# Patient Record
Sex: FEMALE | Race: WHITE | NOT HISPANIC OR LATINO | Employment: OTHER | URBAN - METROPOLITAN AREA
[De-identification: names, ages, dates, MRNs, and addresses within clinical notes are randomized per-mention and may not be internally consistent; named-entity substitution may affect disease eponyms.]

---

## 2018-08-03 ENCOUNTER — TRANSFERRED RECORDS (OUTPATIENT)
Dept: HEALTH INFORMATION MANAGEMENT | Facility: CLINIC | Age: 78
End: 2018-08-03

## 2018-08-14 ENCOUNTER — TRANSFERRED RECORDS (OUTPATIENT)
Dept: HEALTH INFORMATION MANAGEMENT | Facility: CLINIC | Age: 78
End: 2018-08-14

## 2019-10-17 ENCOUNTER — TRANSFERRED RECORDS (OUTPATIENT)
Dept: HEALTH INFORMATION MANAGEMENT | Facility: CLINIC | Age: 79
End: 2019-10-17

## 2019-11-06 ENCOUNTER — TRANSFERRED RECORDS (OUTPATIENT)
Dept: HEALTH INFORMATION MANAGEMENT | Facility: CLINIC | Age: 79
End: 2019-11-06

## 2019-11-22 ENCOUNTER — TRANSFERRED RECORDS (OUTPATIENT)
Dept: HEALTH INFORMATION MANAGEMENT | Facility: CLINIC | Age: 79
End: 2019-11-22

## 2019-11-22 LAB — PHQ9 SCORE: 2

## 2020-01-13 ENCOUNTER — OFFICE VISIT (OUTPATIENT)
Dept: ORTHOPEDICS | Facility: CLINIC | Age: 80
End: 2020-01-13
Payer: MEDICARE

## 2020-01-13 ENCOUNTER — TELEPHONE (OUTPATIENT)
Dept: ORTHOPEDICS | Facility: CLINIC | Age: 80
End: 2020-01-13

## 2020-01-13 VITALS
SYSTOLIC BLOOD PRESSURE: 137 MMHG | WEIGHT: 171 LBS | DIASTOLIC BLOOD PRESSURE: 63 MMHG | HEART RATE: 55 BPM | HEIGHT: 60 IN | BODY MASS INDEX: 33.57 KG/M2

## 2020-01-13 DIAGNOSIS — M51.369 DDD (DEGENERATIVE DISC DISEASE), LUMBAR: ICD-10-CM

## 2020-01-13 DIAGNOSIS — M54.16 LUMBAR RADICULOPATHY: Primary | ICD-10-CM

## 2020-01-13 PROCEDURE — 99203 OFFICE O/P NEW LOW 30 MIN: CPT | Performed by: PREVENTIVE MEDICINE

## 2020-01-13 ASSESSMENT — MIFFLIN-ST. JEOR: SCORE: 1182.15

## 2020-01-13 ASSESSMENT — PAIN SCALES - GENERAL: PAINLEVEL: SEVERE PAIN (7)

## 2020-01-13 NOTE — LETTER
1/13/2020         RE: Liliane Squires  [address]  [place] [address]        Dear Colleague,    Thank you for referring your patient, Liliane Squires, to the [facility]. Please see a copy of my visit note below.    HISTORY OF PRESENT ILLNESS  Ms. Squires is a pleasant 79 year old year old female who presents to clinic today with lumbar pain  Over the past 3 years  And has pain that radiates into her legs as well  Sitting long periods cause pain  Liliane explains that she has been seen by physical therapy several times over the past few years  Has been recommended a nerve ablation in lumbar  Location: low back and legs  Quality:  achy pain    Severity: 6/10 at worst    Duration: worse over past year  Timing: occurs intermittently  Context: occurs while exercising and lifting  Modifying factors:  resting and non-use makes it better, movement and use makes it worse  Associated signs & symptoms: radiation into both legs  Additional history: as documented    MEDICAL HISTORY  There is no problem list on file for this patient.      No current outpatient medications on file.       Allergies   Allergen Reactions     Simvastatin Muscle Pain (Myalgia)     Aspirin GI Disturbance     Upset stomach. 81mg is OK.     Hydrocodone GI Disturbance     Nausea.     Sulfamethoxazole-Trimethoprim Nausea and Vomiting       No family history on file.    Additional medical/Social/Surgical histories reviewed in [employer] and updated as appropriate.     REVIEW OF SYSTEMS (1/13/2020)  10 point ROS of systems including Constitutional, Eyes, Respiratory, Cardiovascular, Gastroenterology, Genitourinary, Integumentary, Musculoskeletal, Psychiatric were all negative except for pertinent positives noted in my HPI.     PHYSICAL EXAM  Vitals:    01/13/20 1042   BP: 137/63   Pulse: 55   Weight: 77.6 kg (171 lb)   Height: 1.524 m (5')     Vital Signs: /63   Pulse 55   Ht 1.524 m (5')   Wt 77.6 kg (171 lb)    BMI 33.40 kg/m    Patient declined being weighed. Body mass index is 33.4 kg/m .    General  - normal appearance, in no obvious distress  CV  - normal peripheral perfusion  Pulm  - normal respiratory pattern, non-labored  Musculoskeletal - lumbar spine  - stance: normal gait without limp, no obvious leg length discrepancy, normal heel and toe walk  - inspection: normal bone and joint alignment, no obvious scoliosis  - palpation: no paravertebral or bony tenderness  - ROM: flexion exacerbates pain, normal extension, sidebending, rotation  - strength: lower extremities 5/5 in all planes  - special tests:  (+) straight leg raise  (+) slump test  Neuro  - patellar and Achilles DTRs 2+ bilaterally, bilateral lower extremity sensory deficit throughout L5 distribution, grossly normal coordination, normal muscle tone  Skin  - no ecchymosis, erythema, warmth, or induration, no obvious rash  Psych  - interactive, appropriate, normal mood and affect  ASSESSMENT & PLAN  80 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        Sincerely,        Maury Hurtado MD

## 2020-01-13 NOTE — PATIENT INSTRUCTIONS
Thanks for coming today.  Ortho[facility] Medicine Clinic  [address] [place] [address]    To schedule future appointments in Ortho Clinic, you may call [phone].    To schedule ordered imaging by your provider:   Call Central Imaging [phone]    To schedule an injection ordered by your provider:  Call Central Imaging Injection scheduling line: [phone]  [facility] available online at:  [email or web address]    Please call if any further questions or concerns ([phone]).  Clinic hours 8 am to 5 pm.    Return to clinic (call) if symptoms worsen or fail to improve.

## 2020-01-13 NOTE — TELEPHONE ENCOUNTER
Pt calling to say she takes Tramadol at night and pt wants to know if she should skip the Tramadol while taking other new med. Please advise with patient

## 2020-01-13 NOTE — PROGRESS NOTES
HISTORY OF PRESENT ILLNESS  Ms. Squires is a pleasant 79 year old year old female who presents to clinic today with lumbar pain  Over the past 3 years  And has pain that radiates into her legs as well  Sitting long periods cause pain  Liliane explains that she has been seen by physical therapy several times over the past few years  Has been recommended a nerve ablation in lumbar  Location: low back and legs  Quality:  achy pain    Severity: 6/10 at worst    Duration: worse over past year  Timing: occurs intermittently  Context: occurs while exercising and lifting  Modifying factors:  resting and non-use makes it better, movement and use makes it worse  Associated signs & symptoms: radiation into both legs  Additional history: as documented    MEDICAL HISTORY  There is no problem list on file for this patient.      No current outpatient medications on file.       Allergies   Allergen Reactions     Simvastatin Muscle Pain (Myalgia)     Aspirin GI Disturbance     Upset stomach. 81mg is OK.     Hydrocodone GI Disturbance     Nausea.     Sulfamethoxazole-Trimethoprim Nausea and Vomiting       No family history on file.    Additional medical/Social/Surgical histories reviewed in [facility] and updated as appropriate.     REVIEW OF SYSTEMS (1/13/2020)  10 point ROS of systems including Constitutional, Eyes, Respiratory, Cardiovascular, Gastroenterology, Genitourinary, Integumentary, Musculoskeletal, Psychiatric were all negative except for pertinent positives noted in my HPI.     PHYSICAL EXAM  Vitals:    01/13/20 1042   BP: 137/63   Pulse: 55   Weight: 77.6 kg (171 lb)   Height: 1.524 m (5')     Vital Signs: /63   Pulse 55   Ht 1.524 m (5')   Wt 77.6 kg (171 lb)   BMI 33.40 kg/m   Patient declined being weighed. Body mass index is 33.4 kg/m .    General  - normal appearance, in no obvious distress  CV  - normal peripheral perfusion  Pulm  - normal respiratory pattern, non-labored  Musculoskeletal - lumbar spine  -  stance: normal gait without limp, no obvious leg length discrepancy, normal heel and toe walk  - inspection: normal bone and joint alignment, no obvious scoliosis  - palpation: no paravertebral or bony tenderness  - ROM: flexion exacerbates pain, normal extension, sidebending, rotation  - strength: lower extremities 5/5 in all planes  - special tests:  (+) straight leg raise  (+) slump test  Neuro  - patellar and Achilles DTRs 2+ bilaterally, bilateral lower extremity sensory deficit throughout L5 distribution, grossly normal coordination, normal muscle tone  Skin  - no ecchymosis, erythema, warmth, or induration, no obvious rash  Psych  - interactive, appropriate, normal mood and affect  ASSESSMENT & PLAN  78 yo female with lumbar ddd, radicular pain  Reviewed lumbar MRI: shows ddd  Ordered GAETANO  Start pool therapy  Start tizanadine nightly PRN    Maury Hurtado MD, CAQSM

## 2020-01-14 NOTE — TELEPHONE ENCOUNTER
Per Dr. Isi RAMSEY to take both medications. Can take 1/2 of each pill.     Attempted to contact patient, call was dropped.

## 2020-01-16 RX ORDER — IBUPROFEN 600 MG/1
TABLET, FILM COATED ORAL EVERY 8 HOURS PRN
COMMUNITY
Start: 2019-07-08

## 2020-01-16 RX ORDER — AMPICILLIN TRIHYDRATE 500 MG
CAPSULE ORAL
COMMUNITY

## 2020-01-16 RX ORDER — ATORVASTATIN CALCIUM 10 MG/1
TABLET, FILM COATED ORAL
COMMUNITY
Start: 2019-11-29

## 2020-01-16 RX ORDER — AMOXICILLIN 250 MG/1
250 CAPSULE ORAL DAILY
COMMUNITY
Start: 2019-11-29

## 2020-01-16 RX ORDER — ATENOLOL AND CHLORTHALIDONE TABLET 50; 25 MG/1; MG/1
TABLET ORAL
COMMUNITY
Start: 2019-07-05

## 2020-01-16 RX ORDER — LEVOTHYROXINE SODIUM 75 UG/1
TABLET ORAL
COMMUNITY
Start: 2019-12-01

## 2020-01-16 RX ORDER — TRAMADOL HYDROCHLORIDE 50 MG/1
TABLET ORAL
COMMUNITY
Start: 2019-12-30

## 2020-01-23 ENCOUNTER — TRANSFERRED RECORDS (OUTPATIENT)
Dept: HEALTH INFORMATION MANAGEMENT | Facility: CLINIC | Age: 80
End: 2020-01-23

## 2020-03-30 ENCOUNTER — TELEPHONE (OUTPATIENT)
Dept: ORTHOPEDICS | Facility: CLINIC | Age: 80
End: 2020-03-30

## 2020-03-30 NOTE — TELEPHONE ENCOUNTER
3/30 Explained we need to reschedule appointment to earlier in the day. Provided phone number [phone] to reschedule.     Ashia Bose   Procedure    Ortho/Sports Med/Ent/Eye   [facility]   [phone]

## 2020-04-02 ENCOUNTER — VIRTUAL VISIT (OUTPATIENT)
Dept: ORTHOPEDICS | Facility: CLINIC | Age: 80
End: 2020-04-02
Payer: MEDICARE

## 2020-04-02 DIAGNOSIS — M51.369 DDD (DEGENERATIVE DISC DISEASE), LUMBAR: ICD-10-CM

## 2020-04-02 DIAGNOSIS — M54.16 LUMBAR RADICULOPATHY: Primary | ICD-10-CM

## 2020-04-02 PROCEDURE — 99442 ZZC PHYSICIAN TELEPHONE EVALUATION 11-20 MIN: CPT | Performed by: PREVENTIVE MEDICINE

## 2020-04-02 RX ORDER — METHYLPREDNISOLONE 4 MG
TABLET, DOSE PACK ORAL
Qty: 21 TABLET | Refills: 0 | Status: SHIPPED | OUTPATIENT
Start: 2020-04-02

## 2020-04-02 RX ORDER — TRAMADOL HYDROCHLORIDE 50 MG/1
50 TABLET ORAL
Qty: 21 TABLET | Refills: 0 | Status: SHIPPED | OUTPATIENT
Start: 2020-04-02

## 2020-04-02 NOTE — PROGRESS NOTES
"Liliane Squires is a 79 year old female who is being evaluated via a billable telephone visit.      After review of patient's medical issues this visit was conducted over the phone, as opposed to in person, in effort to reduce risk of COVID-19 exposure.    The patient has been notified of following:     \"This telephone visit will be conducted via a call between you and your physician/provider. We have found that certain health care needs can be provided without the need for a physical exam.  This service lets us provide the care you need with a short phone conversation.  If a prescription is necessary we can send it directly to your pharmacy.  If lab work is needed we can place an order for that and you can then stop by our lab to have the test done at a later time.    If during the course of the call the physician/provider feels a telephone visit is not appropriate, you will not be charged for this service.\"     Patient has given verbal consent for Telephone visit?  Yes    Liliane Squires complains of  Leg pain bilaterally and low back pain    I have reviewed and updated the patient's Past Medical History, Social History, Family History and Medication List.    ALLERGIES  Simvastatin; Aspirin; Hydrocodone; and Sulfamethoxazole-trimethoprim    Additional provider notes:  Liliane follows up for her lumbar injection  She states that her injection did help with her low back discomfort and has continued to have some pain in legs, worse when she lies down to sleep at nighttime  Has not restarted tizanadine yet    80 yo female with lumbar ddd, radicular pain, not resolved  Had improvement over time with lumbar injection, but it seems to be wearing off  Will start medrol pack and tramadol nightly  Restart use of tizanadine  Will f/u in 1 week and see how she is doing  Consider repeat GAETANO when able, after pandemic    Phone call duration: 12 minutes  Phone call start time: 12:55pm  Phone call end time: " 1:07pm  Maury Hurtado MD

## 2020-04-07 ENCOUNTER — VIRTUAL VISIT (OUTPATIENT)
Dept: ORTHOPEDICS | Facility: CLINIC | Age: 80
End: 2020-04-07
Payer: MEDICARE

## 2020-04-07 DIAGNOSIS — M54.16 LUMBAR RADICULOPATHY: Primary | ICD-10-CM

## 2020-04-07 PROCEDURE — 99442 ZZC PHYSICIAN TELEPHONE EVALUATION 11-20 MIN: CPT | Performed by: PREVENTIVE MEDICINE

## 2020-04-07 NOTE — PATIENT INSTRUCTIONS
Thanks for coming today.  Ortho[facility] Medicine Clinic  [address] [place] [address]    To schedule future appointments in Ortho Clinic, you may call [phone].    To schedule ordered imaging by your provider:   Call Central Imaging [phone]    To schedule an injection ordered by your provider:  Call Central Imaging Injection scheduling line: [phone]  [employer] available online at:  [email or web address]    Please call if any further questions or concerns ([phone]).  Clinic hours 8 am to 5 pm.    Return to clinic (call) if symptoms worsen or fail to improve.

## 2020-04-07 NOTE — PROGRESS NOTES
"Liliane Squires is a 79 year old female who is being evaluated via a billable telephone visit.      After review of patient's medical issues this visit was conducted over the phone, as opposed to in person, in effort to reduce risk of COVID-19 exposure.    The patient has been notified of following:     \"This telephone visit will be conducted via a call between you and your physician/provider. We have found that certain health care needs can be provided without the need for a physical exam.  This service lets us provide the care you need with a short phone conversation.  If a prescription is necessary we can send it directly to your pharmacy.  If lab work is needed we can place an order for that and you can then stop by our lab to have the test done at a later time.    If during the course of the call the physician/provider feels a telephone visit is not appropriate, you will not be charged for this service.\"     Patient has given verbal consent for Telephone visit?  Yes    Liliane Squires complains of  Lumbar radicular pain, ddd      I have reviewed and updated the patient's Past Medical History, Social History, Family History and Medication List.    ALLERGIES  Simvastatin; Aspirin; Hydrocodone; and Sulfamethoxazole-trimethoprim    Additional provider notes:   Liliane presents for followup for lumbar pain  Reviewed her response to her lumbar GAETANO which helped for about 4-6 weeks with her low back pain and radiation of pain into legs, but now getting worse again  Wants to make a plan    78 yo female with lumbar ddd, radicular pain, not resolved  Reviewed her lumbar MRI: shows ddd  Consider GAETANO after pandemic  Is doing ok now, but still has discomfort nighttime  Given tizanadine nightly  Cont. HEP  Consider virtual visits with PT  F/u in 2-3 weeks    Phone call duration: 12 minutes  Phone call start: 10am  Phone call end: 10:12am      Maury Hurtado MD    "

## 2020-04-20 NOTE — PROGRESS NOTES
"Liliane Squires is a 79 year old female who is being evaluated via a billable telephone visit.      The patient has been notified of following:   After review of patient's medical issues this visit was conducted over the phone, as opposed to in person, in effort to reduce risk of COVID-19 exposure.    \"This telephone visit will be conducted via a call between you and your physician/provider. We have found that certain health care needs can be provided without the need for a physical exam.  This service lets us provide the care you need with a short phone conversation.  If a prescription is necessary we can send it directly to your pharmacy.  If lab work is needed we can place an order for that and you can then stop by our lab to have the test done at a later time.    Telephone visits are billed at different rates depending on your insurance coverage. During this emergency period, for some insurers they may be billed the same as an in-person visit.  Please reach out to your insurance provider with any questions.    If during the course of the call the physician/provider feels a telephone visit is not appropriate, you will not be charged for this service.\"    Patient has given verbal consent for Telephone visit?  Yes  Liliane is following up for her bilateral leg pain and lumbar radicular pain which is somewhat controlled, but still bothers her when she walks for 20 minutes  She completed the prednisone last month and felt better  Now she is using tramadol and ibuprofen and tumeric and her tizanadine nightly for pain and sleep which is working well  She is still frustrated with getting pain in her hips and thighs when she walks a lot      Assessment/Plan:  80 yo female with lumbar ddd, radicular pain, stable, not resolved  Reviewed her lumbar MRI: shows ddd  Will plan on GAETANO asap after pandemic is over  Restart prednisone x 7-9 days  Cont. Use of tizanadine and tramadol PRN  Will consider mobic after that  F/u " 1 week      Phone call duration: 13 minutes  Phone call start: 8:24am  Phone call end : 8: 37am    Maury Hurtado MD

## 2020-04-21 ENCOUNTER — TELEPHONE (OUTPATIENT)
Dept: ORTHOPEDICS | Facility: CLINIC | Age: 80
End: 2020-04-21

## 2020-04-21 ENCOUNTER — VIRTUAL VISIT (OUTPATIENT)
Dept: ORTHOPEDICS | Facility: CLINIC | Age: 80
End: 2020-04-21
Payer: MEDICARE

## 2020-04-21 DIAGNOSIS — M54.16 LUMBAR RADICULOPATHY: Primary | ICD-10-CM

## 2020-04-21 PROCEDURE — 99442 ZZC PHYSICIAN TELEPHONE EVALUATION 11-20 MIN: CPT | Performed by: PREVENTIVE MEDICINE

## 2020-04-21 RX ORDER — PREDNISONE 20 MG/1
TABLET ORAL
Qty: 9 TABLET | Refills: 0 | Status: SHIPPED | OUTPATIENT
Start: 2020-04-21

## 2020-04-21 RX ORDER — TRAMADOL HYDROCHLORIDE 50 MG/1
50 TABLET ORAL 2 TIMES DAILY PRN
Qty: 20 TABLET | Refills: 0 | Status: SHIPPED | OUTPATIENT
Start: 2020-04-21 | End: 2020-05-11

## 2020-04-21 NOTE — TELEPHONE ENCOUNTER
4/21 Provided phone number [phone] to schedule telephone visit in one week around 4/28.    Ashia Bose   Procedure    Ortho/Sports Med/Ent/Eye   [facility]   [phone]

## 2020-05-04 NOTE — PROGRESS NOTES
"Liliane Squires is a 79 year old female who is being evaluated via a billable telephone visit.      The patient has been notified of following:   After review of patient's medical issues this visit was conducted over the phone, as opposed to in person, in effort to reduce risk of COVID-19 exposure.    \"This telephone visit will be conducted via a call between you and your physician/provider. We have found that certain health care needs can be provided without the need for a physical exam.  This service lets us provide the care you need with a short phone conversation.  If a prescription is necessary we can send it directly to your pharmacy.  If lab work is needed we can place an order for that and you can then stop by our lab to have the test done at a later time.    Telephone visits are billed at different rates depending on your insurance coverage. During this emergency period, for some insurers they may be billed the same as an in-person visit.  Please reach out to your insurance provider with any questions.    If during the course of the call the physician/provider feels a telephone visit is not appropriate, you will not be charged for this service.\"    Patient has given verbal consent for Telephone visit?  Yes    What phone number would you like to be contacted at? [phone]    How would you like to obtain your AVS? Mail a copy     HISTORY OF PRESENT ILLNESS  Ms. Squires is a pleasant 79 year old year old female who follows up for lumbar radicular pain  Feeling much better with using medrol  Feels like she is in a much better spot  Using tizanadine nightly which helps  Has been on tramadol chronically and will get refills from her PCP    MEDICAL HISTORY  There is no problem list on file for this patient.      Current Outpatient Medications   Medication Sig Dispense Refill     amoxicillin (AMOXIL) 250 MG capsule 250 mg daily        atenolol-chlorthalidone (TENORETIC) 50-25 MG tablet TAKE 1 TABLET BY " MOUTH DAILY       atorvastatin (LIPITOR) 10 MG tablet        Cholecalciferol (D 1000) 25 MCG (1000 UT) CAPS One a day       ibuprofen (ADVIL/MOTRIN) 600 MG tablet        levothyroxine (SYNTHROID/LEVOTHROID) 75 MCG tablet TAKE 1 TABLET BY MOUTH 6 DAYS PER WEEK, SKIP 1 DAY WEEKLY       tiZANidine (ZANAFLEX) 4 MG tablet Take 1-2 tablets (4-8 mg) by mouth nightly as needed 30 tablet 1     traMADol (ULTRAM) 50 MG tablet Take 1 tablet (50 mg) by mouth 2 times daily as needed for severe pain 20 tablet 0     traMADol (ULTRAM) 50 MG tablet Take 1 tablet (50 mg) by mouth nightly as needed for severe pain 21 tablet 0     methylPREDNISolone (MEDROL) 4 MG tablet therapy pack Follow Package Directions (Patient not taking: Reported on 5/4/2020) 21 tablet 0     predniSONE (DELTASONE) 20 MG tablet Take 1 1/2 tablets daily for 4 days and then one pill each day until gone. (Patient not taking: Reported on 5/4/2020) 9 tablet 0     tiZANidine (ZANAFLEX) 4 MG tablet Take 1-2 tablets (4-8 mg) by mouth nightly as needed (Patient not taking: Reported on 5/4/2020) 30 tablet 1     traMADol (ULTRAM) 50 MG tablet          Allergies   Allergen Reactions     Simvastatin Muscle Pain (Myalgia)     Aspirin GI Disturbance     Upset stomach. 81mg is OK.     Hydrocodone GI Disturbance     Nausea.     Sulfamethoxazole-Trimethoprim Nausea and Vomiting       No family history on file.  Social History     Socioeconomic History     Marital status:      Spouse name: Not on file     Number of children: Not on file     Years of education: Not on file     Highest education level: Not on file   Occupational History     Not on file   Social Needs     Financial resource strain: Not on file     Food insecurity     Worry: Not on file     Inability: Not on file     Transportation needs     Medical: Not on file     Non-medical: Not on file   Tobacco Use     Smoking status: Not on file   Substance and Sexual Activity     Alcohol use: Not on file     Drug use:  Not on file     Sexual activity: Not on file   Lifestyle     Physical activity     Days per week: Not on file     Minutes per session: Not on file     Stress: Not on file   Relationships     Social connections     Talks on phone: Not on file     Gets together: Not on file     Attends Nondenominational service: Not on file     Active member of club or organization: Not on file     Attends meetings of clubs or organizations: Not on file     Relationship status: Not on file     Intimate partner violence     Fear of current or ex partner: Not on file     Emotionally abused: Not on file     Physically abused: Not on file     Forced sexual activity: Not on file   Other Topics Concern     Not on file   Social History Narrative     Not on file       Additional medical/Social/Surgical histories reviewed in [facility] and updated as appropriate.     REVIEW OF SYSTEMS (5/5/2020)  10 point ROS of systems including Constitutional, Eyes, Respiratory, Cardiovascular, Gastroenterology, Genitourinary, Integumentary, Musculoskeletal, Psychiatric, Allergic/Immunologic were all negative except for pertinent positives noted in my HPI.     ASSESSMENT & PLAN  78 yo with lumbar ddd, radicular pain, improved  Reviewed her use of prednisone, and feels better  Will still plan on GAETANO when able        Maury Hurtado MD, CAQSM        Phone call duration: 11 minutes  Phone call start:9:19am  Phone call end:9:30am    Maury Hurtado MD

## 2020-05-05 ENCOUNTER — VIRTUAL VISIT (OUTPATIENT)
Dept: ORTHOPEDICS | Facility: CLINIC | Age: 80
End: 2020-05-05
Payer: MEDICARE

## 2020-05-05 DIAGNOSIS — M54.16 LUMBAR RADICULOPATHY: Primary | ICD-10-CM

## 2020-05-05 PROCEDURE — 99442 ZZC PHYSICIAN TELEPHONE EVALUATION 11-20 MIN: CPT | Performed by: PREVENTIVE MEDICINE

## 2020-06-01 ENCOUNTER — TRANSFERRED RECORDS (OUTPATIENT)
Dept: HEALTH INFORMATION MANAGEMENT | Facility: CLINIC | Age: 80
End: 2020-06-01

## 2020-08-31 DIAGNOSIS — M54.16 LUMBAR RADICULOPATHY: ICD-10-CM

## 2020-08-31 NOTE — TELEPHONE ENCOUNTER
tiZANidine (ZANAFLEX) 4 MG tablet  30 tablet  1  5/5/2020   --    Sig - Route: Take 1-2 tablets (4-8 mg) by mouth nightly as needed - Oral    Sent to pharmacy as: tiZANidine HCl 4 MG Oral Tablet (Zanaflex)    Class: E-Prescribe    Order: [id number]    E-Prescribing Status: Receipt confirmed by pharmacy (5/5/2020  9:26 AM CDT)

## 2020-09-17 DIAGNOSIS — Z53.9 ERRONEOUS ENCOUNTER--DISREGARD: Primary | ICD-10-CM

## 2020-09-17 DIAGNOSIS — M51.369 DDD (DEGENERATIVE DISC DISEASE), LUMBAR: Primary | ICD-10-CM

## 2020-09-17 RX ORDER — METHYLPREDNISOLONE 4 MG
TABLET, DOSE PACK ORAL
Qty: 21 TABLET | Refills: 0 | Status: CANCELLED | OUTPATIENT
Start: 2020-09-17

## 2020-09-17 RX ORDER — METHYLPREDNISOLONE 4 MG
TABLET, DOSE PACK ORAL
Qty: 21 TABLET | Refills: 0 | Status: SHIPPED | OUTPATIENT
Start: 2020-09-17

## 2020-11-12 DIAGNOSIS — M54.16 LUMBAR RADICULOPATHY: ICD-10-CM

## 2020-11-12 NOTE — TELEPHONE ENCOUNTER
Disp Refills Start End KIMBERLEE   tiZANidine (ZANAFLEX) 4 MG tablet 30 tablet 1 9/1/2020  No   Sig - Route: Take 1-2 tablets (4-8 mg) by mouth nightly as needed - Oral   Sent to pharmacy as: tiZANidine HCl 4 MG Oral Tablet (Zanaflex)   Class: E-Prescribe   Order: [id number]   E-Prescribing Status: Receipt confirmed by pharmacy (9/1/2020 10:52 AM CDT)

## 2020-11-16 NOTE — TELEPHONE ENCOUNTER
Lisa from [facility] calling requesting a new script for pt for tiZANidine (ZANAFLEX) 4 MG tablet.

## 2023-11-01 ENCOUNTER — TRANSFERRED RECORDS (OUTPATIENT)
Dept: HEALTH INFORMATION MANAGEMENT | Facility: CLINIC | Age: 83
End: 2023-11-01

## 2024-02-22 ENCOUNTER — MEDICAL CORRESPONDENCE (OUTPATIENT)
Dept: HEALTH INFORMATION MANAGEMENT | Facility: CLINIC | Age: 84
End: 2024-02-22
Payer: COMMERCIAL

## 2024-02-23 ENCOUNTER — TRANSCRIBE ORDERS (OUTPATIENT)
Dept: OTHER | Age: 84
End: 2024-02-23

## 2024-02-23 DIAGNOSIS — G45.4 TRANSIENT GLOBAL AMNESIA: Primary | ICD-10-CM

## 2024-05-01 ENCOUNTER — TELEPHONE (OUTPATIENT)
Dept: NEUROLOGY | Facility: CLINIC | Age: 84
End: 2024-05-01
Payer: COMMERCIAL

## 2024-05-01 NOTE — PROGRESS NOTES
INITIAL NEUROLOGY CONSULTATION    DATE OF VISIT: 5/2/2024  CLINIC LOCATION: [facility]  MRN: [id number]  PATIENT NAME: Liliane Squires  YOB: 1940    REASON FOR VISIT: No chief complaint on file.    HISTORY OF PRESENT ILLNESS:                                                    Ms. Liliane Squires is 83 year old right handed female patient with past medical history of chronic kidney disease, hyperglycemia, hypothyroidism, and hypertension,, who was seen today for transient global amnesia.    Per patient's report, ***.    According to Care Everywhere, brain MRI without contrast from 2/22/2024 demonstrated moderate diffuse parenchymal volume loss and moderate chronic microvascular ischemic disease.  No recent labs.  PAST MEDICAL/SURGICAL HISTORY:                                                    I personally reviewed patient's past medical and surgical history with the patient at today's visit.  MEDICATIONS:                                                    I personally reviewed patient's medications and allergies with the patient at today's visit.  ALLERGIES:                                                      Allergies   Allergen Reactions    Simvastatin Muscle Pain (Myalgia)    Aspirin GI Disturbance     Upset stomach. 81mg is OK.    Hydrocodone GI Disturbance     Nausea.    Sulfamethoxazole-Trimethoprim Nausea and Vomiting     EXAM:                                                    VITAL SIGNS:   There were no vitals taken for this visit.  [place] Cognitive Assessment:          [place] Cognitive Assessment Score:   /30.     General: pt is in NAD, cooperative.  Skin: normal turgor, moist mucous membranes, no lesions/rashes noticed.  HEENT: ATNC, EOMI, PERRL, white sclera, normal conjunctiva, no nystagmus or ptosis. No carotid bruits bilaterally.  Respiratory: lung sounds clear to auscultation bilaterally, no crackles, wheezes, rhonchi. Symmetric lung excursion, no  accessory respiratory muscle use.  Cardiovascular: normal S1/S2, no murmurs/rubs/gallops.   Abdomen: Not distended.  : deferred.    Neurological:  Mental: alert, follows commands, MoCA as per above, no aphasia or dysarthria. Fund of knowledge is {MYAPPROPRIATE:217265}  Cranial Nerves:  CN II: visual acuity - able to accurately count fingers with each eye. Visual fields intact, fundi: discs sharp, no papilledema and normal vessels bilaterally.  CN III, IV, VI: EOM intact, pupils equal and reactive  CN V: facial sensation nl  CN VII: face symmetric, no facial droop  CN VIII: hearing normal  CN IX: palate elevation symmetric, uvula at midline  CN XI SCM normal, shoulder shrug nl  CN XII: tongue midline  Motor: Strength: 5/5 in all major groups of all extremities. Normal tone. No abnormal movements. No pronator drift b/l.  Reflexes: Triceps, biceps, brachioradialis, patellar, and achilles reflexes normal and symmetric. No clonus noted. Toes are down-going b/l.   Sensory: light touch, pinprick, and vibration intact. Romberg: negative.  Coordination: FNF and heel-shin tests intact b/l.   Gait:  Normal, able to tandem walk *** without difficulty.  DATA:   LABS/EEG/IMAGING/OTHER STUDIES: I reviewed pertinent medical records, as detailed in the history of present illness.  ASSESSMENT AND PLAN:      ASSESSMENT: Liliane Squires is a 83 year old female patient with listed above past medical history, who presents with ***.    We had a detailed discussion with the patient regarding her presenting complaints.  The neurological exam today is ***.    DIAGNOSES:  No diagnosis found.  PLAN: There are no Patient Instructions on file for this visit.    Total Time: *** minutes spent on the date of the encounter doing chart review, history and exam, documentation and further activities per the note.    Oscar Thompson MD  [facility] Neurology  (Chart documentation was completed in part with Dragon voice-recognition  software. Even though reviewed, some grammatical, spelling, and word errors may remain.)

## 2024-05-01 NOTE — TELEPHONE ENCOUNTER
Attempted to reach patient to remind them about appointment scheduled with Oscar Thompson MD on 5/2/24 in our [place] location.  No one answered the call, no option for voice message.

## 2024-05-02 ENCOUNTER — OFFICE VISIT (OUTPATIENT)
Dept: NEUROLOGY | Facility: CLINIC | Age: 84
End: 2024-05-02
Attending: INTERNAL MEDICINE
Payer: COMMERCIAL

## 2024-05-02 VITALS
OXYGEN SATURATION: 98 % | BODY MASS INDEX: 32.04 KG/M2 | DIASTOLIC BLOOD PRESSURE: 80 MMHG | HEIGHT: 60 IN | HEART RATE: 57 BPM | WEIGHT: 163.2 LBS | SYSTOLIC BLOOD PRESSURE: 145 MMHG

## 2024-05-02 DIAGNOSIS — R55 EPISODE OF LOSS OF CONSCIOUSNESS: Primary | ICD-10-CM

## 2024-05-02 PROCEDURE — 99417 PROLNG OP E/M EACH 15 MIN: CPT | Performed by: PSYCHIATRY & NEUROLOGY

## 2024-05-02 PROCEDURE — 99205 OFFICE O/P NEW HI 60 MIN: CPT | Performed by: PSYCHIATRY & NEUROLOGY

## 2024-05-02 RX ORDER — ATENOLOL 50 MG/1
1 TABLET ORAL EVERY MORNING
COMMUNITY
Start: 2023-08-27 | End: 2024-08-26

## 2024-05-02 RX ORDER — ASPIRIN 81 MG/1
TABLET ORAL
COMMUNITY

## 2024-05-02 RX ORDER — POTASSIUM CITRATE 10 MEQ/1
10 TABLET, EXTENDED RELEASE ORAL 2 TIMES DAILY
COMMUNITY
Start: 2024-04-03

## 2024-05-02 NOTE — PROGRESS NOTES
INITIAL NEUROLOGY CONSULTATION    DATE OF VISIT: 5/2/2024  CLINIC LOCATION: [facility]  MRN: [id number]  PATIENT NAME: Liliane Squires  YOB: 1940    REASON FOR VISIT:   Chief Complaint   Patient presents with    Consult     Patient had an incident of losing conscious     HISTORY OF PRESENT ILLNESS:                                                    Ms. Liliane Squires is 83 year old right handed female patient with past medical history of transient global amnesia, chronic kidney disease, hyperglycemia, hypothyroidism, and hypertension, who was seen today for loss of consciousness episode.  Accompanied by her daughter.    Per patient's report, on February 11, 2024 (Sunday) she was found on the floor unconscious by her daughter.  She has no recollection of what happened, but sustained a fracture of the left humerus.  She denies any focal neurological symptoms.  She reports 2 possible head injuries (1 year ago chair fell off the shelf and hit her head, and [place] and [place]).  No loss of consciousness or any symptoms afterwards.  No prior history of seizures, CNS infections, or strokes.  Had 2 episodes of transient global amnesia in 2008 and 2009, but this episode felt completely different.    According to patient's daughter, on the stated date she was unable to reach the patient and went to her home around 3:30 PM.  Found her in the bathroom unconscious still in her pajamas with pulled down pants.  She suspects that her mother used the bathroom and was getting off the toilet when something happened.  The patient was arousable, but quite sleepy.  She suspects that the patient was down for a few hours.  She did not remember what happened to her starting Saturday night until Monday morning.    Was evaluated at [facility].    According to Care Everywhere, brain MRI without contrast from 2/22/2024 demonstrated  moderate diffuse parenchymal volume loss and moderate chronic microvascular ischemic disease.  Head CT from 2/11/2024 demonstrated diffuse parenchymal volume loss and chronic microvascular ischemic changes.  CT of the cervical spine demonstrated moderate multilevel degenerative changes, but no evidence of acute fracture.  Images were personally reviewed and independently interpreted.    Was evaluated by inpatient neurologist, who ordered EEG, which demonstrated moderate degree of diffuse slowing, but no interictal epileptiform discharges or subclinical seizures.  It felt that her presentation might be consistent with additional episode of transient global amnesia, though possibility of seizure was also discussed.  She was advised not to drive for 6 months.    Most recent laboratory evaluation from April 2024 includes elevated creatinine of 1.21 and glucose of 121.  Her INR was 1.2 (elevated) so as prothrombin time (13.5) in February 2024.  Hemoglobin was low at 9.9 at that time.  CK was 1170 and reduced on subsequent recheck to 905.  TSH was 0.58.  Vitamin B12 was 534.  PAST MEDICAL/SURGICAL HISTORY:                                                    I personally reviewed patient's past medical and surgical history with the patient at today's visit.  MEDICATIONS:                                                    I personally reviewed patient's medications and allergies with the patient at today's visit.  ALLERGIES:                                                      Allergies   Allergen Reactions    Simvastatin Muscle Pain (Myalgia)    Aspirin GI Disturbance     Upset stomach. 81mg is OK.    Hydrocodone GI Disturbance     Nausea.    Sulfamethoxazole-Trimethoprim Nausea and Vomiting     EXAM:                                                    VITAL SIGNS:   BP (!) 142/83 (BP Location: Left arm, Patient Position: Sitting, Cuff Size: Adult Regular)   Pulse 53   Ht 1.524 m (5')   Wt 74 kg (163 lb 3.2 oz)   SpO2  98%   BMI 31.87 kg/m    Orthostatic vital signs:  Vitals:    05/02/24 0918 05/02/24 1005 05/02/24 1012   BP: (!) 142/83 (!) 146/73 (!) 145/80   BP Location: Left arm Right arm Right arm   Patient Position: Sitting Supine Standing   Cuff Size: Adult Regular Adult Regular Adult Regular   Pulse: 53 52 57   SpO2: 98% 98% 98%   Weight: 74 kg (163 lb 3.2 oz)     Height: 1.524 m (5')       Mini-Cog Assessment:  Mini Cog Assessment  Clock Draw Score: 2 Normal  3 Item Recall: 3 objects recalled  Mini Cog Total Score: 5  Administered by: : Stephanie KIM    Mini-Cog Assessment Score: Mini Cog Total Score: 5/5.     General: pt is in NAD, cooperative.  Skin: normal turgor, moist mucous membranes, no lesions/rashes noticed.  HEENT: ATNC, EOMI, PERRL, white sclera, normal conjunctiva, no nystagmus or ptosis. No carotid bruits bilaterally.  Respiratory: lung sounds clear to auscultation bilaterally, no crackles, wheezes, rhonchi. Symmetric lung excursion, no accessory respiratory muscle use.  Cardiovascular: normal S1/S2, no murmurs/rubs/gallops.   Abdomen: Not distended.  : deferred.    Neurological:  Mental: alert, follows commands, mini cog as per above, no aphasia or dysarthria. Fund of knowledge is appropriate for age.  Cranial Nerves:  CN II: visual acuity - able to accurately count fingers with each eye. Visual fields intact, fundi: discs sharp, no papilledema and normal vessels bilaterally.  CN III, IV, VI: EOM intact, pupils equal and reactive  CN V: facial sensation nl  CN VII: face symmetric, no facial droop  CN VIII: hearing normal  CN IX: palate elevation symmetric, uvula at midline  CN XI SCM normal, shoulder shrug nl  CN XII: tongue midline  Motor: Strength: 5/5 in all major groups of all extremities. Normal tone. No abnormal movements. No pronator drift b/l.  Reflexes: Triceps, biceps, brachioradialis, and patellar reflexes normal and symmetric, achilles reflexes are absent bilaterally. No clonus noted. Toes are  down-going b/l.   Sensory: light touch, pinprick, and vibration intact. Romberg: negative.  Coordination: FNF and heel-shin tests intact b/l.   Gait:  Normal casual walk, but has mild difficulty with tandem.  DATA:   LABS/EEG/IMAGING/OTHER STUDIES: I reviewed pertinent medical records, as detailed in the history of present illness.  ASSESSMENT AND PLAN:      ASSESSMENT: Liliane Squires is a 83 year old female patient with listed above past medical history, who presents with loss of consciousness episode in February 2024 without recurrence.    We had a detailed discussion with the patient and her daughter regarding her presenting complaints.  The neurological exam today is non-focal.  Orthostatic testing is negative.  I reviewed brain MRI images with the patient and her daughter.  We also discussed other testing, including EEG.    I reviewed that the clinical presentation is unclear at this point, but points toward possibility of syncope over seizure, though a seizure is not completely excluded.  I am not sure if the clinical picture fits transient global amnesia, but rather would represent toxic metabolic encephalopathy.  I would suggest repeat EEG (3-hour video EEG monitoring) to evaluate for possibility of interictal epileptiform discharges.  We discussed that additional differential includes micturition syncope and cardiogenic syncope.  Looks like echocardiogram was unrevealing, but I would also suggest obtaining 30-day cardiac event monitoring.    If spells continue in the future without clear cardiogenic cause, trial of antiepileptic medication could be considered.    Liliane to follow up with Primary Care provider regarding elevated blood pressure.    DIAGNOSES:    ICD-10-CM    1. Episode of loss of consciousness  R55 Adult Neurology  Referral     EEG Video 2-12 hrs Continuous Monitoring        PLAN: At today's visit we thoroughly discussed various diagnostic possibilities for patient's  symptoms, reviewed previous workup, including brain MRI images, necessary evaluation, and the plan, which includes:  Orders Placed This Encounter   Procedures    EEG Video 2-12 hrs Continuous Monitoring     No new medications.    We also discussed that 30-day heart monitoring could be considered to evaluate for heart causes of her loss of consciousness event.  It could be arranged through her primary care office locally.    [place] driving laws regarding spells of loss of consciousness or postural control were discussed.  No driving for 90 days after the last event.  Every episode should be reported to DMV by the  within 30 days.  Other safety precautions were discussed.      Additional recommendations after the work-up.    Next follow-up appointment is on as needed basis (based on EEG results).    Total Time: 76 minutes spent on the date of the encounter doing chart review, history and exam, documentation and further activities per the note.  Additional time was needed to review previous evaluation, discussed patient's symptoms, needed workup, and the plan along with answering questions that she and her daughter had.    Oscar Thompson MD  [facility] Neurology  (Chart documentation was completed in part with Dragon voice-recognition software. Even though reviewed, some grammatical, spelling, and word errors may remain.)

## 2024-05-02 NOTE — PATIENT INSTRUCTIONS
AFTER VISIT SUMMARY (AVS):    At today's visit we thoroughly discussed various diagnostic possibilities for your symptoms, reviewed previous workup, including brain MRI images, necessary evaluation, and the plan, which includes:  Orders Placed This Encounter   Procedures    EEG Video 2-12 hrs Continuous Monitoring     No new medications.    We also discussed that 30-day heart monitoring could be considered to evaluate for heart causes of your loss of consciousness event.  It could be arranged through your primary care office locally.    [place] driving laws regarding spells of loss of consciousness or postural control were discussed.  No driving for 90 days after the last event.  Every episode should be reported to DMV by the  within 30 days.  Other safety precautions were discussed.      Additional recommendations after the work-up.    Next follow-up appointment is on as needed basis (based on EEG results).    Please do not hesitate to call me with any questions or concerns.    Thanks.

## 2024-05-02 NOTE — PROGRESS NOTES
Liliane Squires is a 83 year old female who presents for:  Chief Complaint   Patient presents with    Consult     Patient had an incident of loss of consciousness at her home while alone and was unconscious for an unknown amount of time want to find out if there was a neurological cause         Initial Vitals:  BP (!) 142/83 (BP Location: Left arm, Patient Position: Sitting, Cuff Size: Adult Regular)   Pulse 53   Ht 5' (1.524 m)   Wt 163 lb 3.2 oz (74 kg)   SpO2 98%   BMI 31.87 kg/m   Estimated body mass index is 31.87 kg/m  as calculated from the following:    Height as of this encounter: 5' (1.524 m).    Weight as of this encounter: 163 lb 3.2 oz (74 kg).. Body surface area is 1.77 meters squared. BP completed using cuff size: regular    Orthostatic Today   (First set of vitals on right arm with adult regular cuff, lying supine)   BP-146/73,  Pulse-52,  O2- 98%  (Second set of vitals taken on right arm, with adult regular cuff while standing)   BP- 145/80, Pulse-57, O2-98%     Stephanie Holt

## 2024-05-13 ENCOUNTER — ANCILLARY PROCEDURE (OUTPATIENT)
Dept: NEUROLOGY | Facility: CLINIC | Age: 84
End: 2024-05-13
Payer: COMMERCIAL

## 2024-05-13 DIAGNOSIS — R55 EPISODE OF LOSS OF CONSCIOUSNESS: ICD-10-CM

## 2024-05-13 PROCEDURE — 95700 EEG CONT REC W/VID EEG TECH: CPT | Performed by: PSYCHIATRY & NEUROLOGY

## 2024-05-13 PROCEDURE — 95718 EEG PHYS/QHP 2-12 HR W/VEEG: CPT | Performed by: PSYCHIATRY & NEUROLOGY

## 2024-05-13 PROCEDURE — 95713 VEEG 2-12 HR CONT MNTR: CPT | Performed by: PSYCHIATRY & NEUROLOGY

## 2024-05-16 ENCOUNTER — VIRTUAL VISIT (OUTPATIENT)
Dept: NEUROLOGY | Facility: CLINIC | Age: 84
End: 2024-05-16
Payer: COMMERCIAL

## 2024-05-16 DIAGNOSIS — R55 EPISODE OF LOSS OF CONSCIOUSNESS: Primary | ICD-10-CM

## 2024-05-16 PROCEDURE — 99214 OFFICE O/P EST MOD 30 MIN: CPT | Mod: 95 | Performed by: PSYCHIATRY & NEUROLOGY

## 2024-05-16 PROCEDURE — G2211 COMPLEX E/M VISIT ADD ON: HCPCS | Mod: 95 | Performed by: PSYCHIATRY & NEUROLOGY

## 2024-05-16 RX ORDER — LEVETIRACETAM 250 MG/1
250 TABLET ORAL 2 TIMES DAILY
Qty: 180 TABLET | Refills: 1 | Status: SHIPPED | OUTPATIENT
Start: 2024-05-16 | End: 2024-08-16

## 2024-05-16 NOTE — PROGRESS NOTES
"ESTABLISHED NEUROLOGY TELEMEDICINE VISIT NOTE.    DATE OF VISIT: 5/16/2024  MRN: [id number]  PATIENT NAME: Liliane Squires  YOB: 1940    Liliane Squires is a 83 year old female who is being evaluated via a billable video visit.      The patient has been notified of following:     \"This video visit will be conducted via a call between you and your physician/provider. We have found that certain health care needs can be provided without the need for an in-person physical exam.  This service lets us provide the care you need with a video conversation.  If a prescription is necessary we can send it directly to your pharmacy.  If lab work is needed we can place an order for that and you can then stop by our lab to have the test done at a later time.    Video visits are billed at different rates depending on your insurance coverage.  Please reach out to your insurance provider with any questions.    If during the course of the call the physician/provider feels a video visit is not appropriate, you will not be charged for this service.\"    Patient has given verbal consent for Video visit? Yes    Will anyone else be joining your video visit? No  {If patient encounters technical issues they should call [phone].    I have reviewed and updated the patient's Past Medical History, Social History, Family History and Medication List.    Stephanie Holt Clinic Assistant     Additional provider notes:    This is a telemedicine visit that was initiated by the patient and performed with the originating site at patient's home and the distant location, as specified below.  Verbal consent to participate in video visit was obtained.  I discussed with the patient the nature of our telemedicine visits, that:  - I would evaluate the patient and recommend diagnostics and treatments based on my assessment  - Our sessions are not being recorded and that personal health information is protected  - Our team would " provide follow-up care in person if/when the patient needs it.    The patient had technical difficulty with video, but was able to use audio.    REASON FOR VISIT:   Chief Complaint   Patient presents with    Follow Up     Review EEG      HISTORY OF PRESENT ILLNESS:                                                    Ms. Liliane Squires is 83 year old female patient, who was evaluated today by telemedicine visit for an episode of loss of consciousness. Please refer to my initial note from 5/2/2024 for further information.  Patient's daughter was also present during this visit.    Since the last visit, the patient reports no new episodes.  She denies interval development of new focal neurological symptoms.  EEG from 5/13/2024 demonstrated single left temporal interictal epileptiform discharge that might lead to increased cortical irritability (personally read).  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  We discussed that likelihood of seizure might be up to 80% over the next 2 years if she left untreated.  We decided to start Keppra after reviewing typical side effects, that include sedation, mood changes and psychosis.  I also encouraged the patient to complete cardiac monitor, as previously discussed.    DIAGNOSES:    ICD-10-CM    1. Episode of loss of consciousness  R55         PLAN: At today's visit we thoroughly discussed current symptoms, evaluation results, diagnosis, available treatment options, and the plan.    We decided to start Keppra.  I advised the patient to take 250 mg at bedtime for 2 to 4 weeks, then increase the dose to 250 mg twice daily.  We discussed that this dose could be further increased if she  experiences additional episodes.    Regarding DVS paperwork, I advised her to send it via My Chart or bring it to the office.    Next follow-up appointment is in the next 6 months or earlier if needed.    This visit will be converted to phone visit due to absence of video.    Distant Location (provider location):  [facility] NEUROLOGY [facility]     Mode of Communication:  Video Conference via Musikki    Total Time: 21 minutes.  15 minutes were spent in audio call and the rest for chart review in preparation for this visit and documentation.    Oscar Thompson MD    [facility] Neurology    (Chart documentation was completed in part with Dragon voice-recognition software. Even though reviewed, some grammatical, spelling, and word errors may remain.)

## 2024-05-16 NOTE — LETTER
"    5/16/2024         RE: Liliane Squires  [address] [place] [address]        Dear Colleague,    Thank you for referring your patient, Liliane Squires, to the [facility] NEUROLOGY CLINICS [facility]. Please see a copy of my visit note below.    ESTABLISHED NEUROLOGY TELEMEDICINE VISIT NOTE.    DATE OF VISIT: 5/16/2024  MRN: [id number]  PATIENT NAME: Liliane Squires  YOB: 1940    Liliane Squires is a 83 year old female who is being evaluated via a billable video visit.      The patient has been notified of following:     \"This video visit will be conducted via a call between you and your physician/provider. We have found that certain health care needs can be provided without the need for an in-person physical exam.  This service lets us provide the care you need with a video conversation.  If a prescription is necessary we can send it directly to your pharmacy.  If lab work is needed we can place an order for that and you can then stop by our lab to have the test done at a later time.    Video visits are billed at different rates depending on your insurance coverage.  Please reach out to your insurance provider with any questions.    If during the course of the call the physician/provider feels a video visit is not appropriate, you will not be charged for this service.\"    Patient has given verbal consent for Video visit? Yes    Will anyone else be joining your video visit? No  {If patient encounters technical issues they should call [phone].    I have reviewed and updated the patient's Past Medical History, Social History, Family History and Medication List.    Stephanie Holt Clinic Assistant     Additional provider notes:    This is a telemedicine visit that was initiated by the patient and performed with the originating site at patient's home and the distant location, as specified below.  Verbal consent to participate in video visit was obtained.  I " discussed with the patient the nature of our telemedicine visits, that:  - I would evaluate the patient and recommend diagnostics and treatments based on my assessment  - Our sessions are not being recorded and that personal health information is protected  - Our team would provide follow-up care in person if/when the patient needs it.    The patient had technical difficulty with video, but was able to use audio.    REASON FOR VISIT:   Chief Complaint   Patient presents with     Follow Up     Review EEG      HISTORY OF PRESENT ILLNESS:                                                    Ms. Liliane Squires is 83 year old female patient, who was evaluated today by telemedicine visit for an episode of loss of consciousness. Please refer to my initial note from 5/2/2024 for further information.  Patient's daughter was also present during this visit.    Since the last visit, the patient reports no new episodes.  She denies interval development of new focal neurological symptoms.  EEG from 5/13/2024 demonstrated single left temporal interictal epileptiform discharge that might lead to increased cortical irritability (personally read).  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  We discussed that likelihood of seizure might be up to 80% over the next 2 years if she left untreated.  We decided to start Keppra after reviewing typical side effects, that include sedation, mood changes and psychosis.  I also encouraged the patient to complete cardiac monitor, as previously discussed.    DIAGNOSES:    ICD-10-CM    1. Episode of loss of consciousness  R55         PLAN: At today's visit we thoroughly discussed current symptoms, evaluation  results, diagnosis, available treatment options, and the plan.    We decided to start Keppra.  I advised the patient to take 250 mg at bedtime for 2 to 4 weeks, then increase the dose to 250 mg twice daily.  We discussed that this dose could be further increased if she experiences additional episodes.    Regarding DVS paperwork, I advised her to send it via My Chart or bring it to the office.    Next follow-up appointment is in the next 6 months or earlier if needed.    This visit will be converted to phone visit due to absence of video.    Distant Location (provider location):  [facility]UROLOG[facility]INICS [facility]     Mode of Communication:  Video Conference via Ebook Glue    Total Time: 21 minutes.  15 minutes were spent in audio call and the rest for chart review in preparation for this visit and documentation.    Oscar Thompson MD    [facility]    (Chart documentation was completed in part with Dragon voice-recognition software. Even though reviewed, some grammatical, spelling, and word errors may remain.)      Again, thank you for allowing me to participate in the care of your patient.        Sincerely,        Oscar Thompson MD

## 2024-05-16 NOTE — PATIENT INSTRUCTIONS
AFTER VISIT SUMMARY (AVS):    At today's visit we thoroughly discussed current symptoms, evaluation results, diagnosis, available treatment options, and the plan.    We decided to start Keppra.  Please take 250 mg at bedtime for 2 to 4 weeks, then increase the dose to 250 mg twice daily.  We discussed that this dose could be further increased if you experience additional episodes.    Regarding DVS paperwork, please send it via My Chart or bring it to the office.    Next follow-up appointment is in the next 6 months or earlier if needed.    Please do not hesitate to call me with any questions or concerns.    Thanks.

## 2024-05-17 ENCOUNTER — MYC MEDICAL ADVICE (OUTPATIENT)
Dept: NEUROLOGY | Facility: CLINIC | Age: 84
End: 2024-05-17
Payer: COMMERCIAL

## 2024-05-17 NOTE — TELEPHONE ENCOUNTER
Form printed and placed at provider desk to be filled out.     Jennifer JULIO RN, BSN  [facility]

## 2024-05-17 NOTE — TELEPHONE ENCOUNTER
Faxed  Loss of Consciousness Form to  [employer]  May 17, 2024 to fax number [phone]    Right Fax confirmed at 3:31:29 PM    Stephanie Holt Clinic Assistant       Copy was scanned to patient file a one was sent to patient via [employer] for their records

## 2024-06-21 ENCOUNTER — MYC MEDICAL ADVICE (OUTPATIENT)
Dept: NEUROLOGY | Facility: CLINIC | Age: 84
End: 2024-06-21
Payer: COMMERCIAL

## 2024-06-24 NOTE — TELEPHONE ENCOUNTER
Per Dr. Thompson:  I would suggest connecting with her primary care provider to exclude additional causes, such as anemia.  Her hemoglobin was low in February 2024, would also check electrolytes, liver and kidney function (CMP) as well as urinalysis.  If all evaluation negative, then she could reduce to half a tablet (125 mg) twice daily.

## 2024-06-24 NOTE — TELEPHONE ENCOUNTER
Per 5/16/24 OV note:  We decided to start Keppra.  I advised the patient to take 250 mg at bedtime for 2 to 4 weeks, then increase the dose to 250 mg twice daily.  We discussed that this dose could be further increased if she experiences additional episodes.

## 2024-06-25 NOTE — TELEPHONE ENCOUNTER
Per Dr. Thompson:  She could reduce the dose to 125 mg twice daily and make a follow-up visit this or next week with me to discuss different options.  Thanks,  Oscar Thompson MD.

## 2024-07-05 ENCOUNTER — VIRTUAL VISIT (OUTPATIENT)
Dept: NEUROLOGY | Facility: CLINIC | Age: 84
End: 2024-07-05
Payer: COMMERCIAL

## 2024-07-05 DIAGNOSIS — R55 EPISODE OF LOSS OF CONSCIOUSNESS: Primary | ICD-10-CM

## 2024-07-05 DIAGNOSIS — T88.7XXA MEDICATION SIDE EFFECTS: ICD-10-CM

## 2024-07-05 PROCEDURE — 99214 OFFICE O/P EST MOD 30 MIN: CPT | Mod: 95 | Performed by: PSYCHIATRY & NEUROLOGY

## 2024-07-05 NOTE — PATIENT INSTRUCTIONS
AFTER VISIT SUMMARY (AVS):    At today's visit we thoroughly discussed current symptoms, available treatment options, and the plan.    We decided to continue Keppra at 250 mg twice daily without changes while monitoring side effects.  If you feel that side effects are intolerable, please reach out to discuss different medications.    Otherwise, next follow-up appointment is in the next 4 months or earlier if needed.    Please do not hesitate to call me with any questions or concerns.    Thanks.

## 2024-07-05 NOTE — PROGRESS NOTES
"ESTABLISHED NEUROLOGY TELEMEDICINE VISIT NOTE.    DATE OF VISIT: 7/5/2024  MRN: [id number]  PATIENT NAME: Liliane Squires  YOB: 1940    Liliane Squires is a 83 year old female who is being evaluated via a billable video visit.      The patient has been notified of following:     \"This video visit will be conducted via a call between you and your physician/provider. We have found that certain health care needs can be provided without the need for an in-person physical exam.  This service lets us provide the care you need with a video conversation.  If a prescription is necessary we can send it directly to your pharmacy.  If lab work is needed we can place an order for that and you can then stop by our lab to have the test done at a later time.    Video visits are billed at different rates depending on your insurance coverage.  Please reach out to your insurance provider with any questions.    If during the course of the call the physician/provider feels a video visit is not appropriate, you will not be charged for this service.\"    Patient has given verbal consent for Video visit? Yes    Will anyone else be joining your video visit? No  {If patient encounters technical issues they should call [phone].    I have reviewed and updated the patient's Past Medical History, Social History, Family History and Medication List.    Stephanie Holt Clinic Assistant      Additional provider notes:    This is a telemedicine visit that was initiated by the patient and performed with the originating site at patient's home and the distant location, as specified below.  Verbal consent to participate in video visit was obtained.  I discussed with the patient the nature of our telemedicine visits, that:  - I would evaluate the patient and recommend diagnostics and treatments based on my assessment  - Our sessions are not being recorded and that personal health information is protected  - Our team would " provide follow-up care in person if/when the patient needs it.    REASON FOR VISIT:   Chief Complaint   Patient presents with    Follow Up     Discuss Meds and Side Effects      HISTORY OF PRESENT ILLNESS:                                                    Ms. Liliane Squires is 83 year old female patient, who was evaluated today by telemedicine visit for episodes of loss of consciousness.  The last visit was done virtually on 5/16/2024.  At that time we decided to start Keppra at low-dose.  Please refer to my initial/other prior notes for further information.  She is together with her daughter.    Since the last visit, the patient reached out to me via My Chart reporting fatigue/weakness and leg pain after increasing Keppra to 250 mg twice daily.  I recommended to reduce the dose to 125 mg twice daily and make this visit to discuss additional options.  However, the patient continued to take 250 mg twice daily (now for approximately 5 weeks) and feels that her side effects are subsiding.  She denies new spells or new neurological symptoms.  EXAM:                                                    General: pt is in NAD, cooperative.  Skin: no lesions/rashes noticed.  HEENT: ATNC.  Neurological:  awake, cooperative, follows commands, face is symmetric, equally moves all extremities, no dysmetria bilaterally.  ASSESSMENT AND PLAN:      ASSESSMENT: Liliane Squires is a 83 year old female patient, who is evaluated via a telemedicine follow-up visit for loss of consciousness spell out with abnormal EEG, suggestive of possibility of epilepsy/seizures.    We had a detailed discussion with the patient and her daughter regarding her current complaints, available treatment options, and the plan.  She experienced tiredness/fatigue after increasing dose of Keppra to twice daily.  However, now she reports that she is tolerating the medication better and feels that reported side effects are subsiding.  She is willing  to continue the same medication without changes, but we discussed additional treatment options, including lamotrigine and Lacosamide briefly.  We might consider those options if side effects of Keppra are intolerable in the future.  I will see her back in 4 months in clinic.    DIAGNOSES:    ICD-10-CM    1. Episode of loss of consciousness  R55       2. Medication side effects  T88.7XXA         PLAN: At today's visit we thoroughly discussed current symptoms, available treatment options, and the plan.    We decided to continue Keppra at 250 mg twice daily without changes while monitoring side effects.  She was advised to reach out to discuss different medications if side effects of Keppra worsen in the future.    Otherwise, next follow-up appointment is in the next 4 months or earlier if needed.    Video-Visit Details    Type of service:  Video Visit    Video Start Time: 10:36 AM.    Video End Time (time video stopped): 10:42 AM.    Originating Location (pt. Location): Home    Distant Location (provider location):  [facility] NEUROLOGY CLINICS [facility]     Mode of Communication:  Video Conference via ACE*COMM    Total Time: 16 minutes.  6 minutes were spent in video call and the rest for chart review in preparation for this visit and documentation.    Oscar Thompson MD    [facility]    (Chart documentation was completed in part with Dragon voice-recognition software. Even though reviewed, some grammatical, spelling, and word errors may remain.)

## 2024-07-05 NOTE — LETTER
"7/5/2024      Liliane Squires  [address] [place] [address]      Dear Colleague,    Thank you for referring your patient, Liliane Squires, to the [facility] NEUROLOGY [facility]. Please see a copy of my visit note below.    ESTABLISHED NEUROLOGY TELEMEDICINE VISIT NOTE.    DATE OF VISIT: 7/5/2024  MRN: [id number]  PATIENT NAME: Liliane Squires  YOB: 1940    Liliane Squires is a 83 year old female who is being evaluated via a billable video visit.      The patient has been notified of following:     \"This video visit will be conducted via a call between you and your physician/provider. We have found that certain health care needs can be provided without the need for an in-person physical exam.  This service lets us provide the care you need with a video conversation.  If a prescription is necessary we can send it directly to your pharmacy.  If lab work is needed we can place an order for that and you can then stop by our lab to have the test done at a later time.    Video visits are billed at different rates depending on your insurance coverage.  Please reach out to your insurance provider with any questions.    If during the course of the call the physician/provider feels a video visit is not appropriate, you will not be charged for this service.\"    Patient has given verbal consent for Video visit? Yes    Will anyone else be joining your video visit? No  {If patient encounters technical issues they should call [phone].    I have reviewed and updated the patient's Past Medical History, Social History, Family History and Medication List.    Stephanie Holt Clinic Assistant      Additional provider notes:    This is a telemedicine visit that was initiated by the patient and performed with the originating site at patient's home and the distant location, as specified below.  Verbal consent to participate in video visit was obtained.  I discussed with the " patient the nature of our telemedicine visits, that:  - I would evaluate the patient and recommend diagnostics and treatments based on my assessment  - Our sessions are not being recorded and that personal health information is protected  - Our team would provide follow-up care in person if/when the patient needs it.    REASON FOR VISIT:   Chief Complaint   Patient presents with     Follow Up     Discuss Meds and Side Effects      HISTORY OF PRESENT ILLNESS:                                                    Ms. Liliane Squires is 83 year old female patient, who was evaluated today by telemedicine visit for episodes of loss of consciousness.  The last visit was done virtually on 5/16/2024.  At that time we decided to start Keppra at low-dose.  Please refer to my initial/other prior notes for further information.  She is together with her daughter.    Since the last visit, the patient reached out to me via My Chart reporting fatigue/weakness and leg pain after increasing Keppra to 250 mg twice daily.  I recommended to reduce the dose to 125 mg twice daily and make this visit to discuss additional options.  However, the patient continued to take 250 mg twice daily (now for approximately 5 weeks) and feels that her side effects are subsiding.  She denies new spells or new neurological symptoms.  EXAM:                                                    General: pt is in NAD, cooperative.  Skin: no lesions/rashes noticed.  HEENT: ATNC.  Neurological:  awake, cooperative, follows commands, face is symmetric, equally moves all extremities, no dysmetria bilaterally.  ASSESSMENT AND PLAN:      ASSESSMENT: Liliane Squires is a 83 year old female patient, who is evaluated via a telemedicine follow-up visit for loss of consciousness spell out with abnormal EEG, suggestive of possibility of epilepsy/seizures.    We had a detailed discussion with the patient and her daughter regarding her current complaints, available  treatment options, and the plan.  She experienced tiredness/fatigue after increasing dose of Keppra to twice daily.  However, now she reports that she is tolerating the medication better and feels that reported side effects are subsiding.  She is willing to continue the same medication without changes, but we discussed additional treatment options, including lamotrigine and Lacosamide briefly.  We might consider those options if side effects of Keppra are intolerable in the future.  I will see her back in 4 months in clinic.    DIAGNOSES:    ICD-10-CM    1. Episode of loss of consciousness  R55       2. Medication side effects  T88.7XXA         PLAN: At today's visit we thoroughly discussed current symptoms, available treatment options, and the plan.    We decided to continue Keppra at 250 mg twice daily without changes while monitoring side effects.  She was advised to reach out to discuss different medications if side effects of Keppra worsen in the future.    Otherwise, next follow-up appointment is in the next 4 months or earlier if needed.    Video-Visit Details    Type of service:  Video Visit    Video Start Time: 10:36 AM.    Video End Time (time video stopped): 10:42 AM.    Originating Location (pt. Location): Home    Distant Location (provider location):  [facility] NEUROLOGY [facility]     Mode of Communication:  Video Conference via [email or web address]    Total Time: 16 minutes.  6 minutes were spent in video call and the rest for chart review in preparation for this visit and documentation.    Oscar Thompson MD    [facility]rology    (Chart documentation was completed in part with Dragon voice-recognition software. Even though reviewed, some grammatical, spelling, and word errors may remain.)      Again, thank you for allowing me to participate in the care of your patient.        Sincerely,        Oscar Thompson MD

## 2024-09-22 ENCOUNTER — HEALTH MAINTENANCE LETTER (OUTPATIENT)
Age: 84
End: 2024-09-22